# Patient Record
(demographics unavailable — no encounter records)

---

## 2024-12-06 NOTE — HISTORY OF PRESENT ILLNESS
[FreeTextEntry1] : KRISTINE RIVERA is a 55 year old female being seen for a follow-up visit for DM Type 2

## 2024-12-06 NOTE — ASSESSMENT
[FreeTextEntry1] : 55-year-old female being seen for a follow-up visit for DM Type 2.  #DM - A1c 8.1 (11/24) down from 9.6 (5/28/2024) - metformin 1000 mg BID - insulin 20 units decludec at night - taking glyxambi - insulin sliding scale premeal - on ozempic  - RTC in 6 months.

## 2025-06-04 NOTE — HISTORY OF PRESENT ILLNESS
[FreeTextEntry1] : medically stable, due to have labs via primary care doctor in the near future, no specific complaints.